# Patient Record
(demographics unavailable — no encounter records)

---

## 2025-07-29 NOTE — HISTORY OF PRESENT ILLNESS
[FreeTextEntry1] : Dear Dr. ALPHONSO CEVALLOS,   I had the pleasure of seeing your patient, LYNDA TUCKER, in my office today, 07/29/2025. As you know, he is a 2 month old male referred to pediatric cardiology due to follow up PFO vs ASD, PPHN.   Lynda was postnatally found to have a PFO vs ASD as well as PPHN in the NICU; echo was performed for respiratory distress. He was born at 36 weeks. Feeding well, no tachypnea or sweating with feeds. No cyanosis. No family history of congenital heart disease or sudden/unexplained death. No family member with a known genetic syndrome.

## 2025-07-29 NOTE — FAMILY HISTORY
[TextEntry] : No family members with congenital heart disease. No sudden unexplained deaths or early deaths. No first degree relatives with early MIs.

## 2025-07-29 NOTE — REVIEW OF SYSTEMS
[Acting Fussy] : not acting ~L fussy [Fever] : no fever [Wgt Loss (___ Lbs)] : no recent weight loss [Pallor] : not pale [Discharge] : no discharge [Redness] : no redness [Nasal Discharge] : no nasal discharge [Nasal Stuffiness] : no nasal congestion [Stridor] : no stridor [Cyanosis] : no cyanosis [Edema] : no edema [Diaphoresis] : not diaphoretic [Tachypnea] : not tachypneic [Wheezing] : no wheezing [Cough] : no cough [Being A Poor Eater] : not a poor eater [Vomiting] : no vomiting [Diarrhea] : no diarrhea [Decrease In Appetite] : appetite not decreased [Fainting (Syncope)] : no fainting [Dec Consciousness] :  no decrease in consciousness [Seizure] : no seizures [Hypotonicity (Flaccid)] : not hypotonic [Refusal to Bear Wgt] : normal weight bearing [Puffy Hands/Feet] : no hand/feet puffiness [Rash] : no rash [Hemangioma] : no hemangioma [Jaundice] : no jaundice [Wound problems] : no wound problems [Bruising] : no tendency for easy bruising [Swollen Glands] : no lymphadenopathy [Enlarged [place]] : the fontanelle was not enlarged [Hoarse Cry] : no hoarse cry [Failure To Thrive] : no failure to thrive [Penis Circumcised] : not circumcised [Undescended Testes] : no undescended testicle [Ambiguous Genitals] : genitals not ambiguous [Dec Urine Output] : no oliguria [Nl] : no feeding issues at this time.

## 2025-07-29 NOTE — DISCUSSION/SUMMARY
[FreeTextEntry1] : LYNDA is a 2-month-old male presenting for follow-up of a previously noted PFO vs ASD and PPHN. On examination, his physical findings are normal. Echocardiogram reveals a small PFO--considered a normal variantwith otherwise normal intracardiac anatomy, good biventricular systolic function, and no pericardial effusion. There is no evidence of persistent pulmonary hypertension (PPHN has resolved).  Given these findings and his reassuring clinical presentation, I provided reassurance to the family. I explained that a PFO is present in approximately 25% of healthy adults, typically remains asymptomatic, and rarely requires intervention. No further cardiac workup or follow-up is necessary at this time. However, I advised re-evaluation if any new or concerning symptoms arise in the future.   Plan: - Return as needed for any new and/or worsening symptoms. - No activity restrictions. - No SBE prophylaxis.     Please do not hesitate to contact me if you have any questions.   Robinson Wadsworth MD, MS, FAAP, FACC Attending Physician, Pediatric Cardiology [facility], [place] Physician [address], [address] [place] [address] Office: [phone] Fax: [phone] Email: [email or web address]     I have spent 50 minutes of time on the encounter excluding separately reported services.